# Patient Record
Sex: FEMALE | Race: WHITE | NOT HISPANIC OR LATINO | Employment: OTHER | URBAN - METROPOLITAN AREA
[De-identification: names, ages, dates, MRNs, and addresses within clinical notes are randomized per-mention and may not be internally consistent; named-entity substitution may affect disease eponyms.]

---

## 2021-09-14 ENCOUNTER — TRANSFERRED RECORDS (OUTPATIENT)
Dept: MULTI SPECIALTY CLINIC | Facility: CLINIC | Age: 71
End: 2021-09-14

## 2021-10-06 ENCOUNTER — ALLIED HEALTH/NURSE VISIT (OUTPATIENT)
Dept: FAMILY MEDICINE | Facility: OTHER | Age: 71
End: 2021-10-06
Attending: FAMILY MEDICINE
Payer: MEDICARE

## 2021-10-06 DIAGNOSIS — Z20.822 COVID-19 RULED OUT: Primary | ICD-10-CM

## 2021-10-06 PROCEDURE — C9803 HOPD COVID-19 SPEC COLLECT: HCPCS

## 2021-10-06 PROCEDURE — U0005 INFEC AGEN DETEC AMPLI PROBE: HCPCS | Mod: ZL

## 2021-10-08 LAB — SARS-COV-2 RNA RESP QL NAA+PROBE: NEGATIVE

## 2021-10-10 ENCOUNTER — HEALTH MAINTENANCE LETTER (OUTPATIENT)
Age: 71
End: 2021-10-10

## 2022-07-12 LAB
CHOLESTEROL (EXTERNAL): 167 MG/DL (ref 125–199)
HDLC SERPL-MCNC: 66 MG/DL (ref 40–180)
LDL CHOLESTEROL CALCULATED (EXTERNAL): 88 MG/DL (ref 0–100)
TRIGLYCERIDES (EXTERNAL): 66 MG/DL (ref 0–149)

## 2022-08-05 ENCOUNTER — OFFICE VISIT (OUTPATIENT)
Dept: FAMILY MEDICINE | Facility: OTHER | Age: 72
End: 2022-08-05
Attending: FAMILY MEDICINE
Payer: MEDICARE

## 2022-08-05 ENCOUNTER — HOSPITAL ENCOUNTER (OUTPATIENT)
Dept: GENERAL RADIOLOGY | Facility: OTHER | Age: 72
Discharge: HOME | End: 2022-08-05
Attending: FAMILY MEDICINE
Payer: MEDICARE

## 2022-08-05 VITALS
WEIGHT: 198 LBS | BODY MASS INDEX: 35.08 KG/M2 | RESPIRATION RATE: 16 BRPM | HEART RATE: 66 BPM | HEIGHT: 63 IN | OXYGEN SATURATION: 97 % | SYSTOLIC BLOOD PRESSURE: 136 MMHG | DIASTOLIC BLOOD PRESSURE: 88 MMHG | TEMPERATURE: 98.1 F

## 2022-08-05 DIAGNOSIS — M25.562 ACUTE PAIN OF LEFT KNEE: ICD-10-CM

## 2022-08-05 DIAGNOSIS — M25.562 ACUTE PAIN OF LEFT KNEE: Primary | ICD-10-CM

## 2022-08-05 DIAGNOSIS — R25.2 LEG CRAMPING: ICD-10-CM

## 2022-08-05 PROBLEM — Z95.5 S/P CORONARY ARTERY STENT PLACEMENT: Status: ACTIVE | Noted: 2021-06-17

## 2022-08-05 PROBLEM — E66.01 CLASS 2 SEVERE OBESITY DUE TO EXCESS CALORIES WITH SERIOUS COMORBIDITY AND BODY MASS INDEX (BMI) OF 37.0 TO 37.9 IN ADULT (H): Status: ACTIVE | Noted: 2019-01-17

## 2022-08-05 PROBLEM — I25.10 ATHEROSCLEROTIC HEART DISEASE OF NATIVE CORONARY ARTERY WITHOUT ANGINA PECTORIS: Status: ACTIVE | Noted: 2017-09-14

## 2022-08-05 PROBLEM — Z71.89 OTHER SPECIFIED COUNSELING: Chronic | Status: ACTIVE | Noted: 2017-09-20

## 2022-08-05 PROBLEM — E66.812 CLASS 2 SEVERE OBESITY DUE TO EXCESS CALORIES WITH SERIOUS COMORBIDITY AND BODY MASS INDEX (BMI) OF 37.0 TO 37.9 IN ADULT (H): Status: ACTIVE | Noted: 2019-01-17

## 2022-08-05 PROBLEM — E78.00 PURE HYPERCHOLESTEROLEMIA: Status: ACTIVE | Noted: 2019-01-17

## 2022-08-05 PROBLEM — Z12.11 ENCOUNTER FOR SCREENING COLONOSCOPY: Status: ACTIVE | Noted: 2019-05-21

## 2022-08-05 PROBLEM — E78.2 MIXED HYPERLIPIDEMIA: Status: ACTIVE | Noted: 2021-06-17

## 2022-08-05 PROBLEM — C50.412: Status: ACTIVE | Noted: 2017-08-29

## 2022-08-05 PROBLEM — Z45.2 ENCOUNTER FOR ADJUSTMENT AND MANAGEMENT OF VASCULAR ACCESS DEVICE: Status: ACTIVE | Noted: 2017-10-31

## 2022-08-05 PROBLEM — I46.9 CARDIAC ARREST (H): Status: ACTIVE | Noted: 2021-06-17

## 2022-08-05 PROBLEM — I10 ESSENTIAL HYPERTENSION: Status: ACTIVE | Noted: 2019-01-17

## 2022-08-05 PROBLEM — E66.01 MORBID (SEVERE) OBESITY DUE TO EXCESS CALORIES (H): Status: ACTIVE | Noted: 2018-11-30

## 2022-08-05 PROBLEM — Z12.4 SCREENING FOR CERVICAL CANCER: Status: ACTIVE | Noted: 2019-05-07

## 2022-08-05 PROBLEM — Z92.3 HISTORY OF EXTERNAL BEAM RADIATION THERAPY: Status: ACTIVE | Noted: 2018-03-27

## 2022-08-05 LAB
ANION GAP SERPL CALCULATED.3IONS-SCNC: 8 MMOL/L (ref 3–14)
BUN SERPL-MCNC: 21 MG/DL (ref 7–25)
CALCIUM SERPL-MCNC: 9.1 MG/DL (ref 8.6–10.3)
CHLORIDE BLD-SCNC: 105 MMOL/L (ref 98–107)
CO2 SERPL-SCNC: 25 MMOL/L (ref 21–31)
CREAT SERPL-MCNC: 0.73 MG/DL (ref 0.6–1.2)
GFR SERPL CREATININE-BSD FRML MDRD: 87 ML/MIN/1.73M2
GLUCOSE BLD-MCNC: 94 MG/DL (ref 70–105)
MAGNESIUM SERPL-MCNC: 2 MG/DL (ref 1.9–2.7)
POTASSIUM BLD-SCNC: 4.2 MMOL/L (ref 3.5–5.1)
SODIUM SERPL-SCNC: 138 MMOL/L (ref 134–144)

## 2022-08-05 PROCEDURE — G0463 HOSPITAL OUTPT CLINIC VISIT: HCPCS

## 2022-08-05 PROCEDURE — 36415 COLL VENOUS BLD VENIPUNCTURE: CPT | Mod: ZL | Performed by: FAMILY MEDICINE

## 2022-08-05 PROCEDURE — 83735 ASSAY OF MAGNESIUM: CPT | Mod: ZL | Performed by: FAMILY MEDICINE

## 2022-08-05 PROCEDURE — 73562 X-RAY EXAM OF KNEE 3: CPT | Mod: LT

## 2022-08-05 PROCEDURE — G0463 HOSPITAL OUTPT CLINIC VISIT: HCPCS | Mod: 25

## 2022-08-05 PROCEDURE — 80048 BASIC METABOLIC PNL TOTAL CA: CPT | Mod: ZL | Performed by: FAMILY MEDICINE

## 2022-08-05 PROCEDURE — 99213 OFFICE O/P EST LOW 20 MIN: CPT | Performed by: FAMILY MEDICINE

## 2022-08-05 RX ORDER — ATORVASTATIN CALCIUM 40 MG/1
40 TABLET, FILM COATED ORAL
COMMUNITY
Start: 2021-03-01

## 2022-08-05 RX ORDER — VIT A/VIT C/VIT E/ZINC/COPPER 4296-226
1 CAPSULE ORAL
COMMUNITY

## 2022-08-05 RX ORDER — ATENOLOL 25 MG/1
TABLET ORAL
COMMUNITY
Start: 2020-06-18

## 2022-08-05 RX ORDER — ASPIRIN 325 MG
325 TABLET ORAL
COMMUNITY

## 2022-08-05 RX ORDER — ANASTROZOLE 1 MG/1
TABLET ORAL
COMMUNITY
Start: 2021-06-23

## 2022-08-05 RX ORDER — ANASTROZOLE 1 MG/1
1 TABLET ORAL DAILY
COMMUNITY
Start: 2022-06-20

## 2022-08-05 RX ORDER — LORAZEPAM 1 MG/1
1 TABLET ORAL
COMMUNITY
Start: 2021-05-28

## 2022-08-05 RX ORDER — COVID-19 MOLECULAR TEST ASSAY
KIT MISCELLANEOUS
COMMUNITY
Start: 2021-12-02

## 2022-08-05 RX ORDER — LISINOPRIL 5 MG/1
TABLET ORAL
COMMUNITY
Start: 2021-05-28

## 2022-08-05 ASSESSMENT — ENCOUNTER SYMPTOMS
NUMBNESS: 0
FEVER: 0
PARESTHESIAS: 0
CHILLS: 0

## 2022-08-05 ASSESSMENT — PAIN SCALES - GENERAL: PAINLEVEL: SEVERE PAIN (6)

## 2022-08-05 NOTE — PROGRESS NOTES
Assessment & Plan     Acute pain of left knee  Suspect musculoskeletal in etiology, given her pain with range of motion, walking, and weightbearing.  Palpable distal pulses so low likelihood of claudication causing her symptoms.  XR with moderate degenerative change.  Encourage conservative management.  PT referral placed for further evaluation and management.  She will follow-up with her PCP if her symptoms are not improving despite conservative management.  - XR Knee Left 3 Views; Future  - Physical Therapy Referral; Future    Leg cramping  Check electrolytes and magnesium.  Plan to treat as indicated pending results.  Encourage hydration.  Follow-up with PCP for persistent symptoms and any complications.  - Magnesium  - Basic Metabolic Panel    Ellie Mcdaniel DO  [facility]   Jesenia is a 72 year old, presenting for the following health issues:  Knee Pain (Left)      Knee Pain  Pertinent negatives include no chills, fever or numbness.   History of Present Illness       Reason for visit:  Knee and calf pain  Symptom onset:  1-2 weeks ago  Symptoms include:  Pain, stiffness  Symptom intensity:  Moderate  Symptom progression:  Staying the same  Had these symptoms before:  No  What makes it worse:  Extended time on feet or walking  What makes it better:  Sitting, elevating leg    She eats 2-3 servings of fruits and vegetables daily.She consumes 0 sweetened beverage(s) daily.She exercises with enough effort to increase her heart rate 30 to 60 minutes per day.  She exercises with enough effort to increase her heart rate 4 days per week.   She is taking medications regularly.     She reports that she has been having left leg pain for the past two weeks.  Pain has been located down the lateral aspect of her lower leg.  She has noticed some medial swelling of her left knee as well.  Pain is worse with weight bearing and walking and improved with rest.  Bending her leg at the knee also  "provokes pain.  She reports that symptoms started after a weekend golfing.  She reports that she was golfing over several hills.  She denies any other known injury.  She has had some foot and calf cramping.    Review of Systems   Constitutional: Negative for chills and fever.   Neurological: Negative for numbness and paresthesias.          Objective    /88   Pulse 66   Temp 98.1  F (36.7  C) (Tympanic)   Resp 16   Ht 1.6 m (5' 3\")   Wt 89.8 kg (198 lb)   LMP  (LMP Unknown)   SpO2 97%   Breastfeeding No   BMI 35.07 kg/m    Body mass index is 35.07 kg/m .  Physical Exam  Cardiovascular:      Pulses:           Dorsalis pedis pulses are 2+ on the right side and 2+ on the left side.   Pulmonary:      Effort: Pulmonary effort is normal.   Musculoskeletal:      Comments: Soft tissue swelling over medial joint space of left knee.  Tenderness to palpation of lateral tibial plateau.  No muscular tenderness. Normal active range of motion. Negative anterior and posterior drawer test. No appreciable medial or lateral collateral ligament laxity. Negative Thessaly bilaterally.   Psychiatric:         Mood and Affect: Mood normal.     XR Knee Left 3 Views    Result Date: 8/5/2022  PROCEDURE:  XR KNEE LEFT 3 VIEWS HISTORY: Acute pain of left knee COMPARISON:  None. TECHNIQUE:  3 views of the left knee were obtained. FINDINGS:  There are moderate degenerative changes at the medial femoral tibial articulation. Mild degenerative changes are present at the patellofemoral articulation. The lateral femoral tibial articulation appears normal. The distal femur proximal tibia and fibula are intact. No knee effusion is seen.     IMPRESSION: Osteoarthritic changes which are most severe at the medial femoral tibial articulation.  AGA TORO MD   SYSTEM ID:  [id number]    "

## 2022-08-05 NOTE — NURSING NOTE
"Chief Complaint   Patient presents with     Knee Pain     Left         Initial /88   Pulse 66   Temp 98.1  F (36.7  C) (Tympanic)   Resp 16   Ht 1.6 m (5' 3\")   Wt 89.8 kg (198 lb)   LMP  (LMP Unknown)   SpO2 97%   Breastfeeding No   BMI 35.07 kg/m   Estimated body mass index is 35.07 kg/m  as calculated from the following:    Height as of this encounter: 1.6 m (5' 3\").    Weight as of this encounter: 89.8 kg (198 lb).         Norma J. Gosselin, ISAEL   "

## 2025-05-17 ENCOUNTER — HEALTH MAINTENANCE LETTER (OUTPATIENT)
Age: 75
End: 2025-05-17